# Patient Record
Sex: MALE | Race: BLACK OR AFRICAN AMERICAN | NOT HISPANIC OR LATINO | Employment: FULL TIME | URBAN - METROPOLITAN AREA
[De-identification: names, ages, dates, MRNs, and addresses within clinical notes are randomized per-mention and may not be internally consistent; named-entity substitution may affect disease eponyms.]

---

## 2017-01-23 ENCOUNTER — HOSPITAL ENCOUNTER (EMERGENCY)
Facility: HOSPITAL | Age: 27
Discharge: HOME | End: 2017-01-23
Payer: COMMERCIAL

## 2017-01-23 VITALS
HEIGHT: 74 IN | OXYGEN SATURATION: 99 % | SYSTOLIC BLOOD PRESSURE: 125 MMHG | TEMPERATURE: 98 F | DIASTOLIC BLOOD PRESSURE: 77 MMHG | BODY MASS INDEX: 28.23 KG/M2 | HEART RATE: 73 BPM | WEIGHT: 220 LBS | RESPIRATION RATE: 18 BRPM

## 2017-01-23 DIAGNOSIS — S60.415A ABRASION OF LEFT RING FINGER, INITIAL ENCOUNTER: Primary | ICD-10-CM

## 2017-01-23 PROCEDURE — 99283 EMERGENCY DEPT VISIT LOW MDM: CPT

## 2017-01-23 RX ORDER — MUPIROCIN 20 MG/G
OINTMENT TOPICAL 2 TIMES DAILY
Qty: 30 G | Refills: 0 | Status: SHIPPED | OUTPATIENT
Start: 2017-01-23 | End: 2017-02-02

## 2017-01-23 NOTE — ED AVS SNAPSHOT
[facility]  [address] [facility] [address]               Alex Grant    2017 12:14 PM   ED    Description:  Male : 1990   Department:  [facility]            Your Care was Coordinated By:     Provider Role From To    Sumit Aguilar Jr., NICKO Nurse Practitioner 17 1214 --      Reason for Visit     Finger Pain           Diagnoses this Visit        Comments    Abrasion of left ring finger, initial encounter    -  Primary       ED Disposition     None           To Do List           Follow-up Information     Follow up with [facility]. Schedule an appointment as soon as possible for a visit in 2 days.    Why:  If symptoms worsen return to ED    Contact information:    [address] [facility] [address]  [phone]         These Medications        Disp Refills Start End    mupirocin (BACTROBAN) 2 % ointment 30 g 0 2017    Apply topically 2 (two) times daily. - Topical (Top)      [facility]     [facility] -  Assistance  Registered nurses in the [facility] provide clinical advisement, health education, appointment booking, and other advisory services.  Call for this free service at [phone].             Medications           Message regarding Medications     Verify the changes and/or additions to your medication regime listed below are the same as discussed with your clinician today.  If any of these changes or additions are incorrect, please notify your healthcare provider.        START taking these NEW medications        Refills    mupirocin (BACTROBAN) 2 % ointment 0    Sig: Apply topically 2 (two) times daily.    Class: Print    Route: Topical (Top)           Verify that the below list of medications is an accurate representation of the medications you are currently taking.  If none reported, the list may be blank. If incorrect, please  "contact your healthcare provider. Carry this list with you in case of emergency.           Current Medications     mupirocin (BACTROBAN) 2 % ointment Apply topically 2 (two) times daily.    ondansetron (ZOFRAN-ODT) 4 MG TbDL Take 1 tablet (4 mg total) by mouth every 8 (eight) hours as needed.           Clinical Reference Information           Your Vitals Were     BP Pulse Temp Resp Height Weight    125/77 (BP Location: Right arm, Patient Position: Sitting) 73 98.1 °F (36.7 °C) (Oral) 18 6' 2" (1.88 m) 99.8 kg (220 lb)    SpO2 BMI             99% 28.25 kg/m2         Allergies as of 1/23/2017        Reactions    Pcn [Penicillins]       Immunizations Administered on Date of Encounter - 1/23/2017     None      ED Micro, Lab, POCT     None      ED Imaging Orders     None        Discharge Instructions         Abrasions  Abrasions are skin scrapes. Their treatment depends on how large and deep the abrasion is.  Home care  You may be prescribed an antibiotic cream or ointment to apply to the wound. This helps prevent infection. Follow instructions when using this medication.  General care  · To care for the abrasion, do the following each day for as long as directed by your health care provider.  ¨ If you were given a bandage, change it once a day. If your bandage sticks to the wound, soak it in warm water until it loosens.  ¨ Wash the area with soap and warm water. You may do this in a sink or under a tub faucet or shower. Rinse off the soap. Then pat the area dry with a clean towel.  ¨ If antibiotic ointment or cream was prescribed, reapply it to the wound as directed. Cover the wound with a fresh non-stick bandage. If the bandage becomes wet or dirty, change it as soon as possible.  · You may use acetaminophen or ibuprofen to control pain unless another pain medication was prescribed. Note: If you have chronic liver or kidney disease or ever had a stomach ulcer or GI bleeding, talk with your health care provider before " using these medications. Do not use ibuprofen in children under six months of age.  · Most skin wounds heal within ten days. But an infection may occur despite treatment. Therefore, monitor the wound for signs of infection as listed below.  Follow-up care  Follow up with your health care provider, or as advised.  When to seek medical advice  Call your health care provider right away if any of these occur:  · Fever of 101ºF (38.3ºC) or higher, or as directed by your health care provider  · Increasing pain, redness, swelling, or drainage from the wound  · Bleeding from the wound that does not stop after a few minutes of steady, firm pressure  · Decreased ability to move any body part near wound  © 8254-1037 [employer]. [address] [place] [address], [place] [address]. All rights reserved. This information is not intended as a substitute for professional medical care. Always follow your healthcare professional's instructions.          [facility] Sign-Up     Activating your [facility] account is as easy as 1-2-3!     1) Visit [email or web address], select Sign Up Now, enter this activation code and your date of birth, then select Next.  [email or web address]  Expires: 3/9/2017 12:24 PM      2) Create a username and password to use when you visit [facility] in the future and select a security question in case you lose your password and select Next.    3) Enter your e-mail address and click Sign Up!    Additional Information  If you have questions, please e-mail [email or web address] or call [phone] to talk to our [facility] staff. Remember, [facility] is NOT to be used for urgent needs. For medical emergencies, dial 911.          [facility] complies with applicable Federal civil rights laws and does not discriminate on the basis of race, color, national origin, age, disability, or sex.        Language Assistance Services     ATTENTION: Language assistance services are available, free of charge. Please call  [phone].      ATENCIÓN: Si habla español, tiene a mcpherson disposición servicios gratuitos de asistencia lingüística. Llame al [phone].     CHÚ Ý: N?u b?n nói Ti?ng Vi?t, có các d?ch v? h? tr? ngôn ng? mi?n phí dành cho b?n. G?i s? [phone].

## 2017-01-23 NOTE — ED PROVIDER NOTES
Encounter Date: 1/23/2017       History     Chief Complaint   Patient presents with    Finger Pain     hurt ring finger on Lt 1 wk ago and swells up at night      Review of patient's allergies indicates:   Allergen Reactions    Pcn [penicillins]      HPI Comments: 26 year old male presents with complaint of left 4th finger pain that began gradually 1.5 weeks ago while using a . Symptoms are constant and mild. No modifying factors. Symptoms are associated with swelling at night. No pain to describe. No radiation. No fever. Tetanus utd.    The history is provided by the patient.     History reviewed. No pertinent past medical history.  No past medical history pertinent negatives.  History reviewed. No pertinent past surgical history.  History reviewed. No pertinent family history.  Social History   Substance Use Topics    Smoking status: Never Smoker    Smokeless tobacco: Never Used    Alcohol use Yes      Comment: rarely     Review of Systems   Constitutional: Negative.  Negative for appetite change, chills and fever.   HENT: Negative.  Negative for congestion and rhinorrhea.    Eyes: Negative.    Respiratory: Negative.  Negative for chest tightness, shortness of breath and wheezing.    Cardiovascular: Negative.  Negative for chest pain and palpitations.   Gastrointestinal: Negative.  Negative for abdominal pain, nausea and vomiting.   Endocrine: Negative.    Genitourinary: Negative.    Musculoskeletal: Negative.  Negative for back pain and neck pain.   Skin: Positive for wound. Negative for pallor and rash.   Allergic/Immunologic: Negative.    Neurological: Negative.  Negative for dizziness and syncope.   Hematological: Negative.    Psychiatric/Behavioral: Negative.    All other systems reviewed and are negative.      Physical Exam   Initial Vitals   BP Pulse Resp Temp SpO2   01/23/17 1206 01/23/17 1206 01/23/17 1206 01/23/17 1206 01/23/17 1206   125/77 73 18 98.1 °F (36.7 °C) 99 %     Physical  Exam    Nursing note and vitals reviewed.  Constitutional: He appears well-developed and well-nourished.   HENT:   Head: Normocephalic and atraumatic.   Nose: Nose normal.   Mouth/Throat: Oropharynx is clear and moist.   Eyes: Conjunctivae and EOM are normal.   Neck: Normal range of motion. Neck supple.   Cardiovascular: Normal rate, regular rhythm and normal heart sounds.   No murmur heard.  Pulmonary/Chest: Breath sounds normal. No respiratory distress. He has no wheezes. He has no rhonchi. He has no rales. He exhibits no tenderness.   Abdominal: Soft. Bowel sounds are normal. He exhibits no distension. There is no tenderness. There is no rebound and no guarding.   Musculoskeletal: Normal range of motion.   5/5 strength of all fingers left upper ext. No deformities.   Neurological: He is alert and oriented to person, place, and time. He has normal strength. No sensory deficit.   Skin: Skin is warm and dry.   Superficial abrasion that is healing and crusted noted to distal left 4th finger dorsal surfaces  Just superior to proximal nail fold. No tenderness, drainage, no increased warmth, erythema, induration or fluctuance.   Psychiatric: He has a normal mood and affect. Thought content normal.         ED Course   Procedures  Labs Reviewed - No data to display        The patient is resting comfortably and is in no acute distress. Discussed test results, shared treatment plan, specific conditions for return, and importance of follow up with the appropriate physician with patient. He understands and agrees with the plan as discussed. Answered his questions at this time. He has remained hemodynamically stable throughout the ED course and is appropriate for discharge home.                       ED Course     Clinical Impression:   The encounter diagnosis was Abrasion of left ring finger, initial encounter.          Sumit Aguilar Jr., [facility]  01/23/17 7666

## 2017-01-23 NOTE — DISCHARGE INSTRUCTIONS
Abrasions  Abrasions are skin scrapes. Their treatment depends on how large and deep the abrasion is.  Home care  You may be prescribed an antibiotic cream or ointment to apply to the wound. This helps prevent infection. Follow instructions when using this medication.  General care  · To care for the abrasion, do the following each day for as long as directed by your health care provider.  ¨ If you were given a bandage, change it once a day. If your bandage sticks to the wound, soak it in warm water until it loosens.  ¨ Wash the area with soap and warm water. You may do this in a sink or under a tub faucet or shower. Rinse off the soap. Then pat the area dry with a clean towel.  ¨ If antibiotic ointment or cream was prescribed, reapply it to the wound as directed. Cover the wound with a fresh non-stick bandage. If the bandage becomes wet or dirty, change it as soon as possible.  · You may use acetaminophen or ibuprofen to control pain unless another pain medication was prescribed. Note: If you have chronic liver or kidney disease or ever had a stomach ulcer or GI bleeding, talk with your health care provider before using these medications. Do not use ibuprofen in children under six months of age.  · Most skin wounds heal within ten days. But an infection may occur despite treatment. Therefore, monitor the wound for signs of infection as listed below.  Follow-up care  Follow up with your health care provider, or as advised.  When to seek medical advice  Call your health care provider right away if any of these occur:  · Fever of 101ºF (38.3ºC) or higher, or as directed by your health care provider  · Increasing pain, redness, swelling, or drainage from the wound  · Bleeding from the wound that does not stop after a few minutes of steady, firm pressure  · Decreased ability to move any body part near wound  © 2704-9695 [employer]. [address] [place] [address], [place] [address]. All rights reserved. This  information is not intended as a substitute for professional medical care. Always follow your healthcare professional's instructions.

## 2018-10-09 ENCOUNTER — HOSPITAL ENCOUNTER (EMERGENCY)
Facility: HOSPITAL | Age: 28
Discharge: HOME | End: 2018-10-09
Attending: EMERGENCY MEDICINE

## 2018-10-09 VITALS
WEIGHT: 221.25 LBS | OXYGEN SATURATION: 98 % | DIASTOLIC BLOOD PRESSURE: 84 MMHG | HEART RATE: 68 BPM | TEMPERATURE: 99 F | BODY MASS INDEX: 28.4 KG/M2 | RESPIRATION RATE: 18 BRPM | SYSTOLIC BLOOD PRESSURE: 160 MMHG

## 2018-10-09 DIAGNOSIS — L02.11 ABSCESS OF NECK: Primary | ICD-10-CM

## 2018-10-09 PROCEDURE — 99283 EMERGENCY DEPT VISIT LOW MDM: CPT

## 2018-10-09 RX ORDER — SULFAMETHOXAZOLE AND TRIMETHOPRIM 800; 160 MG/1; MG/1
2 TABLET ORAL 2 TIMES DAILY
Qty: 28 TABLET | Refills: 0 | Status: SHIPPED | OUTPATIENT
Start: 2018-10-09 | End: 2018-10-16

## 2018-10-09 NOTE — ED NOTES
Pt examined by Dr Gonzales  without RN, educated on prescriptions, given discharge instructions and discharged to [facility]. See provider notes for exam.

## 2018-10-09 NOTE — ED PROVIDER NOTES
SCRIBE #1 NOTE: I, Ned Escobedo, am scribing for, and in the presence of, Isidro Gonzales MD. I have scribed the entire note.      History      Chief Complaint   Patient presents with    Abscess     pt reports abscess to neck with drainage since Friday       Review of patient's allergies indicates:   Allergen Reactions    Pcn [penicillins]         HPI   HPI    10/9/2018, 10:42 AM   History obtained from the patient      History of Present Illness: Alex Grant Jr. is a 28 y.o. male patient who presents to the Emergency Department for an evaluation of an abscess to his right neck which onset gradually a few days ago. Symptoms are constant and moderate in severity. Exacerbated by nothing and relieved by nothing. No associated sxs. Patient denies any fever, chills, warmth, swelling, N/V, and all other sxs at this time. No further complaints or concerns at this time.     Arrival mode: Personal vehicle    PCP: Primary Doctor No       Past Medical History:  Past medical history reviewed not relevant      Past Surgical History:  Past surgical history reviewed not relevant      Family History:  Family history reviewed not relevant      Social History:  Social History     Tobacco Use    Smoking status: Never Smoker    Smokeless tobacco: Never Used   Substance and Sexual Activity    Alcohol use: Yes     Comment: rarely    Drug use: No    Sexual activity: Unknown       ROS   Review of Systems   Constitutional: Negative for chills and fever.   HENT: Negative for congestion, sore throat and trouble swallowing.    Eyes: Negative for photophobia and visual disturbance.   Respiratory: Negative for cough, shortness of breath and wheezing.    Cardiovascular: Negative for chest pain.   Gastrointestinal: Negative for abdominal pain, nausea and vomiting.   Genitourinary: Negative for dysuria, frequency, hematuria and urgency.   Musculoskeletal: Negative for back pain, joint swelling, neck pain and neck stiffness.   Skin:  Negative for pallor and rash.        (+) Abscess  (-) Increased warmth  (+) Erythema  (-) Swelling to the area   (-) Drainage from the site   Neurological: Negative for dizziness, weakness, light-headedness, numbness and headaches.   Hematological: Does not bruise/bleed easily.   All other systems reviewed and are negative.    Physical Exam      Initial Vitals [10/09/18 1038]   BP Pulse Resp Temp SpO2   (!) 160/84 68 18 98.5 °F (36.9 °C) 98 %      MAP       --          Physical Exam  Nursing Notes and Vital Signs Reviewed.  Constitutional: Patient is in no acute distress. Well-developed and well-nourished.  Head: Atraumatic. Normocephalic.  Eyes: PERRL. EOM intact. Conjunctivae are not pale. No scleral icterus.  ENT: Mucous membranes are moist. Oropharynx is clear and symmetric.    Neck: Supple. Full ROM.  Cardiovascular: Regular rate. Regular rhythm.  Pulmonary/Chest: No respiratory distress.   Abdominal: Soft and non-distended.   Musculoskeletal: Moves all extremities. No obvious deformities.  Skin: Warm and dry. Draining abscess noted to right neck.   Neurological:  Alert, awake, and appropriate.  Normal speech.  No acute focal neurological deficits are appreciated.  Psychiatric: Normal affect. Good eye contact. Appropriate in content.    ED Course    Procedures  ED Vital Signs:  Vitals:    10/09/18 1038   BP: (!) 160/84   Pulse: 68   Resp: 18   Temp: 98.5 °F (36.9 °C)   TempSrc: Oral   SpO2: 98%   Weight: 100.4 kg (221 lb 3.7 oz)            The Emergency Provider reviewed the vital signs and test results, which are outlined above.    ED Discussion     10:50 AM: Reassessed pt at this time. Pt is awake, alert, and in NAD. Pt states his condition has improved at this time. Discussed with pt all pertinent ED information. Discussed pt dx and plan of tx. Gave pt all f/u and return to the ED instructions. All questions and concerns were addressed at this time. Pt expresses understanding of information and instructions,  and is comfortable with plan to discharge. Pt is stable for discharge.    I discussed with patient and/or family/caretaker that evaluation in the ED does not suggest any emergent or life threatening medical conditions requiring immediate intervention beyond what was provided in the ED, and I believe patient is safe for discharge.  Regardless, an unremarkable evaluation in the ED does not preclude the development or presence of a serious of life threatening condition. As such, patient was instructed to return immediately for any worsening or change in current symptoms.      ED Medication(s):  Medications - No data to display    Follow-up Information     [facility] In 2 days.    Contact information:  [address] [facility] [id number] [phone]                     Medical Decision Making              Scribe Attestation:   Scribe #1: I performed the above scribed service and the documentation accurately describes the services I performed. I attest to the accuracy of the note.    Attending:   Physician Attestation Statement for Scribe #1: I, Isidro Gonzales MD, personally performed the services described in this documentation, as scribed by Ned Escobedo, in my presence, and it is both accurate and complete.          Clinical Impression       ICD-10-CM ICD-9-CM   1. Abscess of neck L02.11 682.1       Disposition:   Disposition: Discharged  Condition: Stable         Isidro Gonzales MD  10/09/18 1056

## 2020-04-16 ENCOUNTER — HOSPITAL ENCOUNTER (EMERGENCY)
Facility: HOSPITAL | Age: 30
Discharge: HOME | End: 2020-04-16
Attending: EMERGENCY MEDICINE
Payer: OTHER GOVERNMENT

## 2020-04-16 VITALS
BODY MASS INDEX: 29.04 KG/M2 | HEART RATE: 64 BPM | SYSTOLIC BLOOD PRESSURE: 126 MMHG | OXYGEN SATURATION: 99 % | WEIGHT: 226.31 LBS | RESPIRATION RATE: 18 BRPM | TEMPERATURE: 98 F | HEIGHT: 74 IN | DIASTOLIC BLOOD PRESSURE: 78 MMHG

## 2020-04-16 DIAGNOSIS — R19.7 DIARRHEA, UNSPECIFIED TYPE: ICD-10-CM

## 2020-04-16 DIAGNOSIS — B34.9 VIRAL SYNDROME: Primary | ICD-10-CM

## 2020-04-16 DIAGNOSIS — Z20.822 COVID-19 VIRUS NOT DETECTED: ICD-10-CM

## 2020-04-16 LAB — SARS-COV-2 RDRP RESP QL NAA+PROBE: NEGATIVE

## 2020-04-16 PROCEDURE — 99282 EMERGENCY DEPT VISIT SF MDM: CPT | Mod: ER

## 2020-04-16 PROCEDURE — U0002 COVID-19 LAB TEST NON-CDC: HCPCS | Mod: ER

## 2020-04-16 NOTE — ED NOTES
Aaox3,skin warm and dry, resp unlabored and even. Amb with steady gait and guo well. C/o dry cough, diarrhea and nausea for few days . Also coworker tested positive

## 2020-04-16 NOTE — ED PROVIDER NOTES
Encounter Date: 4/16/2020       History     Chief Complaint   Patient presents with    Cough     cough, diarrhea and nausea for few days. someone at his work tested positive       Chief complaint: Diarrhea with nausea and cough.    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 The patient c/o onset of diarrhea with nausea and a cough a few days ago. Nonproductive cough. No known fever or chills. No aggravating or alleviating symptoms. No meds taken for symptoms. Severity is mild. No accompanying symptoms.        Review of patient's allergies indicates:   Allergen Reactions    Pcn [penicillins]      History reviewed. No pertinent past medical history.  History reviewed. No pertinent surgical history.  History reviewed. No pertinent family history.  Social History     Tobacco Use    Smoking status: Never Smoker    Smokeless tobacco: Never Used   Substance Use Topics    Alcohol use: Yes     Comment: occas    Drug use: No     Review of Systems   Constitutional: Negative for activity change, appetite change and fever.   HENT: Negative.    Respiratory: Positive for cough. Negative for shortness of breath.    Gastrointestinal: Positive for diarrhea and nausea. Negative for abdominal pain and vomiting.   Genitourinary: Negative.    Musculoskeletal: Positive for myalgias.   Skin: Negative.    Neurological: Negative.    Psychiatric/Behavioral: Negative.    All other systems reviewed and are negative.      Physical Exam     Initial Vitals [04/16/20 1115]   BP Pulse Resp Temp SpO2   (!) 170/88 67 20 98 °F (36.7 °C) 100 %      MAP       --         Physical Exam    Nursing note and vitals reviewed.  Constitutional: He appears well-developed and well-nourished.   HENT:   Head: Normocephalic and atraumatic.   Right Ear: External ear normal.   Left Ear: External ear normal.   Nose: Nose normal.   Mouth/Throat: Oropharynx is clear and moist.   Eyes: Conjunctivae and EOM are  normal. Pupils are equal, round, and reactive to light.   Neck: Normal range of motion.   Cardiovascular: Normal rate, regular rhythm and intact distal pulses.   Pulmonary/Chest: Breath sounds normal.   Abdominal: Soft. He exhibits no distension. There is no tenderness.   Musculoskeletal: Normal range of motion. He exhibits no tenderness.   Neurological: He is alert and oriented to person, place, and time. He has normal strength. GCS score is 15. GCS eye subscore is 4. GCS verbal subscore is 5. GCS motor subscore is 6.   Skin: Skin is warm and dry. Capillary refill takes less than 2 seconds.   Psychiatric: He has a normal mood and affect. His behavior is normal. Judgment and thought content normal.         ED Course   Procedures  Labs Reviewed   SARS-COV-2 RNA AMPLIFICATION, QUAL    Narrative:     What symptom criteria does the patient meet?->Cough          Imaging Results    None          Medical Decision Making:   ED Management:  Pt appears well with no signs of illness clinically. No signs of dehydration. Respirations unlabored and  02 sat 99%. Afebrile and COVID negative. Advised to return if any increasing SOB, chest pain or any other concerns. COVID prevention encouraged and work dependent on Company's + or - recommendation for routine testing prior to return to work.                                 Clinical Impression:                ICD-10-CM ICD-9-CM   1. Viral syndrome B34.9 079.99   2. Diarrhea, unspecified type R19.7 787.91   3. Covid-19 Virus not Detected [id number]             Hadley Garcia MD  04/16/20 1345

## 2021-07-29 ENCOUNTER — HOSPITAL ENCOUNTER (EMERGENCY)
Facility: HOSPITAL | Age: 31
Discharge: HOME | End: 2021-07-29
Attending: EMERGENCY MEDICINE
Payer: COMMERCIAL

## 2021-07-29 VITALS
HEART RATE: 81 BPM | SYSTOLIC BLOOD PRESSURE: 149 MMHG | RESPIRATION RATE: 20 BRPM | OXYGEN SATURATION: 98 % | TEMPERATURE: 99 F | WEIGHT: 224.31 LBS | HEIGHT: 74 IN | BODY MASS INDEX: 28.79 KG/M2 | DIASTOLIC BLOOD PRESSURE: 86 MMHG

## 2021-07-29 DIAGNOSIS — U07.1 COVID-19 VIRUS DETECTED: ICD-10-CM

## 2021-07-29 DIAGNOSIS — U07.1 COVID-19: Primary | ICD-10-CM

## 2021-07-29 LAB
CTP QC/QA: YES
SARS-COV-2 RDRP RESP QL NAA+PROBE: POSITIVE

## 2021-07-29 PROCEDURE — U0002 COVID-19 LAB TEST NON-CDC: HCPCS | Mod: ER | Performed by: EMERGENCY MEDICINE

## 2021-07-29 PROCEDURE — 99282 EMERGENCY DEPT VISIT SF MDM: CPT | Mod: 25,ER
